# Patient Record
Sex: MALE | URBAN - METROPOLITAN AREA
[De-identification: names, ages, dates, MRNs, and addresses within clinical notes are randomized per-mention and may not be internally consistent; named-entity substitution may affect disease eponyms.]

---

## 2021-08-24 ENCOUNTER — IMPORTED ENCOUNTER (OUTPATIENT)
Dept: URBAN - METROPOLITAN AREA CLINIC 31 | Facility: CLINIC | Age: 46
End: 2021-08-24

## 2021-08-24 PROCEDURE — 92015 DETERMINE REFRACTIVE STATE: CPT

## 2021-08-24 PROCEDURE — 92004 COMPRE OPH EXAM NEW PT 1/>: CPT

## 2021-08-24 NOTE — PATIENT DISCUSSION
1.  Refractive error - recommend MF rx Transitions. Discussed adaptation. 2. Return for an appointment in 1 year for comprehensive exam. with Dr. Madi Jules.

## 2021-10-08 NOTE — PATIENT DISCUSSION
RECOMMEND COSMETIC UPPER EYELID BLEPHAROPLASTY, OU.           Discussed the r/b of the procedure in detail with the patient today.  Patient expressed understanding and will follow up for surgery at her convenience. Patient/Caregiver provided printed discharge information.

## 2022-04-02 ASSESSMENT — VISUAL ACUITY
OD_SC: 20/25
OU_CC: 20/25
OD_CC: 20/30-2
OS_SC: 20/30
OS_CC: 20/25-2
OU_SC: 20/20

## 2022-04-02 ASSESSMENT — TONOMETRY
OS_IOP_MMHG: 14
OD_IOP_MMHG: 14